# Patient Record
(demographics unavailable — no encounter records)

---

## 2025-07-15 NOTE — CONSULT LETTER
[Dear  ___] : Dear  [unfilled], [Consult Letter:] : I had the pleasure of evaluating your patient, [unfilled]. [( Thank you for referring [unfilled] for consultation for _____ )] : Thank you for referring [unfilled] for consultation for [unfilled] [Please see my note below.] : Please see my note below. [Consult Closing:] : Thank you very much for allowing me to participate in the care of this patient.  If you have any questions, please do not hesitate to contact me. [Sincerely,] : Sincerely, [FreeTextEntry2] : Dr. Carolyn Caputo-Mirianasiadis  [address] [place] [address]  [FreeTextEntry3] : Gage Junior MD, FACOG, FACS  Minimally Invasive Gynecologic Surgery  [address] [address]  Tel: [phone]  Fax: [phone]   I have reviewed and confirmed nurses' notes for patient's medications, allergies, medical history, and surgical history.

## 2025-07-15 NOTE — DISCUSSION/SUMMARY
[FreeTextEntry1] : 43 yo with abnormal pelvic ultrasound presents for consultation - Endometrial Polyp : The patient has been diagnosed with an endometrial polyp based on ultrasound findings. This is likely contributing to her abnormal uterine bleeding and spotting. - Therapeutic Interventions: Schedule hysteroscopy for polyp removal. - Diagnostic Tests: Perform pre-operative blood tests and physical exam - Patient Education: Explained the hysteroscopy procedure, its risks, and post-operative care. - Follow-Up: Schedule surgery after patient returns from her trip to [facility]. - Uterine Fibroids : Two small fibroids identified in the endometrial cavity. Given their small size, they are not likely the primary cause of her symptoms and do not require surgical intervention at this time. - Therapeutic Interventions: Monitor fibroids; no immediate intervention required. - Patient Education: Explained that the fibroids are small and do not necessitate removal at this time. - Abnormal Uterine Bleeding : Patient reports heavy menstrual bleeding and recent prolonged menstruation. - Therapeutic Interventions: Prescribe tranexamic acid for management of heavy bleeding. - Patient Education: Instructed on the use of tranexamic acid during heavy bleeding days. - Follow-Up: blood work preoperative testing -low risk patient for low risk procedure, medically cleared  - History of Endometriosis : Patient has a history of stage 3 endometriosis, surgically treated in 2021. - Monitoring: Continue to monitor for recurrence of endometriosis symptoms. - Patient Education: Discussed potential impact of endometriosis on menstrual symptoms. - Missed Menstrual Period : Patient reports missing her most recent menstrual period. - Monitoring: Advise patient to monitor for return of menstruation. - Patient Education: Explained possible causes of missed period, including recent illness and antibiotic use. - Follow-Up: Reassess at next appointment if menstruation has not returned.

## 2025-07-15 NOTE — HISTORY OF PRESENT ILLNESS
[Patient reported mammogram was normal] : Patient reported mammogram was normal [Patient reported breast sonogram was normal] : Patient reported breast sonogram was normal [Patient reported PAP Smear was normal] : Patient reported PAP Smear was normal [Patient reported colonoscopy was normal] : Patient reported colonoscopy was normal [N] : Patient does not use contraception [Y] : Positive pregnancy history [Regular Cycle Intervals] : periods have been regular [Frequency: Q ___ days] : menstrual periods occur approximately every [unfilled] days [Menarche Age: ____] : age at menarche was [unfilled] [Currently Active] : currently active [Men] : men [No] : No [Mammogramdate] : 2024 [BreastSonogramDate] : 2024 [PapSmeardate] : 06/2025 [ColonoscopyDate] : 2014 [LMPDate] : 06/08/25 [MensesFreq] : 26 [MensesLength] : 7 [PGHxTotal] : 4 [[facility] : [facility] [FreeTextEntry1] : 06/08/25

## 2025-07-15 NOTE — PHYSICAL EXAM
[Appropriately responsive] : appropriately responsive [Alert] : alert [No Acute Distress] : no acute distress [Oriented x3] : oriented x3 [Soft] : soft [Non-tender] : non-tender [No Lymphadenopathy] : no lymphadenopathy [Regular Rate Rhythm] : regular rate rhythm [No Murmurs] : no murmurs [Clear to Auscultation B/L] : clear to auscultation bilaterally

## 2025-07-15 NOTE — REASON FOR VISIT
[Follow-Up] : a follow-up evaluation of [FreeTextEntry2] : Fibroids/polyp [FreeTextEntry1] : Dr. Caputo